# Patient Record
Sex: MALE | Race: WHITE | URBAN - METROPOLITAN AREA
[De-identification: names, ages, dates, MRNs, and addresses within clinical notes are randomized per-mention and may not be internally consistent; named-entity substitution may affect disease eponyms.]

---

## 2019-02-13 ENCOUNTER — HOSPITAL ENCOUNTER (OUTPATIENT)
Dept: LAB | Age: 54
Discharge: HOME | End: 2019-02-13

## 2019-02-13 PROCEDURE — 88305 TISSUE EXAM BY PATHOLOGIST: CPT

## 2022-10-09 ENCOUNTER — HOSPITAL ENCOUNTER (EMERGENCY)
Dept: CT IMAGING | Age: 57
Discharge: HOME | End: 2022-10-12

## 2022-10-09 ENCOUNTER — HOSPITAL ENCOUNTER (EMERGENCY)
Age: 57
Discharge: HOME | End: 2022-10-09
Attending: EMERGENCY MEDICINE

## 2022-10-09 VITALS
WEIGHT: 270 LBS | TEMPERATURE: 98.6 F | RESPIRATION RATE: 18 BRPM | OXYGEN SATURATION: 96 % | HEIGHT: 77 IN | BODY MASS INDEX: 31.88 KG/M2 | DIASTOLIC BLOOD PRESSURE: 88 MMHG | HEART RATE: 76 BPM | SYSTOLIC BLOOD PRESSURE: 154 MMHG

## 2022-10-09 DIAGNOSIS — H53.2 DOUBLE VISION WITH BOTH EYES OPEN: ICD-10-CM

## 2022-10-09 DIAGNOSIS — H49.12 LEFT-SIDED FOURTH CRANIAL NERVE PALSY: Primary | ICD-10-CM

## 2022-10-09 LAB
ALBUMIN SERPL-MCNC: 3.8 G/DL (ref 3.5–5)
ALBUMIN/GLOB SERPL: 1.1 {RATIO} (ref 1.2–3.5)
ALP SERPL-CCNC: 89 U/L (ref 50–136)
ALT SERPL-CCNC: 44 U/L (ref 12–65)
ANION GAP SERPL CALC-SCNC: 4 MMOL/L (ref 4–13)
AST SERPL-CCNC: 30 U/L (ref 15–37)
BILIRUB SERPL-MCNC: 0.4 MG/DL (ref 0.2–1.1)
BUN SERPL-MCNC: 19 MG/DL (ref 6–23)
CALCIUM SERPL-MCNC: 9.4 MG/DL (ref 8.3–10.4)
CHLORIDE SERPL-SCNC: 104 MMOL/L (ref 101–110)
CO2 SERPL-SCNC: 30 MMOL/L (ref 21–32)
CREAT SERPL-MCNC: 1.17 MG/DL (ref 0.8–1.5)
ERYTHROCYTE [DISTWIDTH] IN BLOOD BY AUTOMATED COUNT: 11.5 % (ref 11.9–14.6)
GLOBULIN SER CALC-MCNC: 3.4 G/DL (ref 2.3–3.5)
GLUCOSE SERPL-MCNC: 151 MG/DL (ref 65–100)
HCT VFR BLD AUTO: 43.1 % (ref 41.1–50.3)
HGB BLD-MCNC: 15.2 G/DL (ref 13.6–17.2)
MCH RBC QN AUTO: 31.9 PG (ref 26.1–32.9)
MCHC RBC AUTO-ENTMCNC: 35.3 G/DL (ref 31.4–35)
MCV RBC AUTO: 90.5 FL (ref 79.6–97.8)
NRBC # BLD: 0 K/UL (ref 0–0.2)
PLATELET # BLD AUTO: 146 K/UL (ref 150–450)
PMV BLD AUTO: 10.2 FL (ref 9.4–12.3)
POTASSIUM SERPL-SCNC: 3.9 MMOL/L (ref 3.5–5.1)
PROT SERPL-MCNC: 7.2 G/DL (ref 6.3–8.2)
RBC # BLD AUTO: 4.76 M/UL (ref 4.23–5.6)
SODIUM SERPL-SCNC: 138 MMOL/L (ref 136–145)
WBC # BLD AUTO: 4.8 K/UL (ref 4.3–11.1)

## 2022-10-09 PROCEDURE — 99284 EMERGENCY DEPT VISIT MOD MDM: CPT

## 2022-10-09 PROCEDURE — 70450 CT HEAD/BRAIN W/O DYE: CPT

## 2022-10-09 PROCEDURE — 70486 CT MAXILLOFACIAL W/O DYE: CPT

## 2022-10-09 PROCEDURE — 80053 COMPREHEN METABOLIC PANEL: CPT

## 2022-10-09 PROCEDURE — 6370000000 HC RX 637 (ALT 250 FOR IP): Performed by: EMERGENCY MEDICINE

## 2022-10-09 PROCEDURE — 85027 COMPLETE CBC AUTOMATED: CPT

## 2022-10-09 RX ORDER — ACETAMINOPHEN 325 MG/1
650 TABLET ORAL
Status: COMPLETED | OUTPATIENT
Start: 2022-10-09 | End: 2022-10-09

## 2022-10-09 RX ORDER — TETRACAINE HYDROCHLORIDE 5 MG/ML
1 SOLUTION OPHTHALMIC ONCE
Status: DISCONTINUED | OUTPATIENT
Start: 2022-10-09 | End: 2022-10-09 | Stop reason: HOSPADM

## 2022-10-09 RX ADMIN — ACETAMINOPHEN 650 MG: 325 TABLET, FILM COATED ORAL at 20:53

## 2022-10-09 ASSESSMENT — ENCOUNTER SYMPTOMS
COLOR CHANGE: 0
PHOTOPHOBIA: 1
COUGH: 0
DIARRHEA: 0
EYE DISCHARGE: 0
SINUS PRESSURE: 1
BACK PAIN: 0
SHORTNESS OF BREATH: 0
ABDOMINAL PAIN: 0
VOMITING: 0
NAUSEA: 0
RHINORRHEA: 0
EYE PAIN: 0

## 2022-10-09 ASSESSMENT — PAIN DESCRIPTION - DESCRIPTORS: DESCRIPTORS: TENDER

## 2022-10-09 ASSESSMENT — VISUAL ACUITY: OD: 20/20 WITH GLASSES

## 2022-10-09 ASSESSMENT — PAIN DESCRIPTION - LOCATION: LOCATION: EYE

## 2022-10-09 ASSESSMENT — PAIN SCALES - GENERAL: PAINLEVEL_OUTOF10: 5

## 2022-10-09 ASSESSMENT — PAIN DESCRIPTION - ORIENTATION: ORIENTATION: RIGHT;LEFT

## 2022-10-09 ASSESSMENT — PAIN - FUNCTIONAL ASSESSMENT: PAIN_FUNCTIONAL_ASSESSMENT: 0-10

## 2022-10-09 NOTE — ED TRIAGE NOTES
Patient reports on Friday 10/7 he noticed his vision was a little blurry. Pt reports increased blurriness on Saturday morning and vision has not improved since then. Pt also reports nasal congestion and sinus pressure x1 week. Pt denies recent eye injury. [facility] in triage 0.

## 2022-10-09 NOTE — ED PROVIDER NOTES
Emergency Department Provider Note                   PCP:                Rani Man MD               Age: 62 y.o. Sex: male       ICD-10-CM    1. Left-sided fourth cranial nerve palsy  H49.12       2. Double vision with both eyes open  H53.2           DISPOSITION Decision To Discharge 10/09/2022 08:42:45 PM        MDM  Number of Diagnoses or Management Options  Diagnosis management comments: Since extraocular movements are intact and his pupils are equal round reactive to light. I do not see any obvious sign of a cranial nerve third fourth or sixth palsy. Staining of the eye was negative for corneal abrasion and the pressure on the left was 21 and the pressure on the right was 18. Funduscopic exam was done and I did not see an obvious retinal hemorrhage, however exam was limited and I cannot see his optic disks clearly. CT scan imaging of the sinuses showed no significant sinusitis and only some mild maxillary lower wall inflammation. CT scan of the brain was normal.  Patient will need referral to ophthalmology certainly but I will obtain a neurology consult via teleneurology to see if any further CT scan imaging such as a CTA or MRI is warranted at this time. 8:42 PM  Patient and his wife would wish to be discharged and follow-up with eye doctor do not wish to wait any longer for the teleneurology consult. I believe this is okay and appropriate. I recommended patching the eye to help with his symptoms and we will provide him follow-up with a neuro-ophthalmologist as best I can. Amount and/or Complexity of Data Reviewed  Clinical lab tests: ordered and reviewed  Tests in the radiology section of CPT®: ordered and reviewed  Discuss the patient with other providers: yes (Case with the on-call ophthalmologist Dr. Lulu Sena.   Based on my examination and the history he believes the patient has a 4th cranial nerve palsy and reported to me that those can be very subtle and not obvious. He states they do not do imaging unless the persistent symptoms occur and last for 3 months. Teleneurology consult still pending. He recommended patching the eye to help with the symptoms so he does not have to tolerate the double vision. He recommended follow-up with a neuro-ophthalmologist.)    Risk of Complications, Morbidity, and/or Mortality  Presenting problems: moderate  Diagnostic procedures: low  Management options: low    Patient Progress  Patient progress: stable             Orders Placed This Encounter   Procedures    CT HEAD WO CONTRAST    CT SINUS WO CONTRAST    CBC    Comprehensive Metabolic Panel    Visual Acuity Screening    Please Bring Woods Lamp to Bedside    Insert peripheral IV        Medications   tetracaine (TETRAVISC) 0.5 % ophthalmic solution 1 drop (has no administration in time range)   fluorescein ophthalmic strip 1 mg (has no administration in time range)   acetaminophen (TYLENOL) tablet 650 mg (has no administration in time range)       New Prescriptions    No medications on file        Nikc Lanier is a 62 y.o. male who presents to the Emergency Department with chief complaint of    Chief Complaint   Patient presents with    Blurred Vision      80-year-old male presents with complaint of blurred vision which he describes as a double vision where things seem stacked on top of each other when he uses both eyes. His symptoms improve with covering each eye. His symptoms improved when looking up. For 5 days or so he has had some sinus congestion and frontal sinus pressure, however that is relieved today after using Ellie pot and sinus rinse at home. He denies any fevers or chills. He has had mild frontal headaches but nothing severe. He denies any numbness tingling or weakness on the left or right or any trouble with his speech or swallowing. Review of Systems   Constitutional:  Negative for chills and fever.    HENT:  Positive for congestion and sinus pressure. Negative for rhinorrhea. Eyes:  Positive for photophobia and visual disturbance. Negative for pain and discharge. Respiratory:  Negative for cough and shortness of breath. Cardiovascular:  Negative for chest pain and leg swelling. Gastrointestinal:  Negative for abdominal pain, diarrhea, nausea and vomiting. Endocrine: Negative for polydipsia and polyuria. Genitourinary:  Negative for dysuria, frequency and hematuria. Musculoskeletal:  Negative for back pain and myalgias. Skin:  Negative for color change and rash. Neurological:  Negative for weakness and numbness. All other systems reviewed and are negative. No past medical history on file. No past surgical history on file. No family history on file. Social History     Socioeconomic History    Marital status:          Patient has no known allergies. Previous Medications    No medications on file        Vitals signs and nursing note reviewed. No data found. Physical Exam  Vitals and nursing note reviewed. Constitutional:       Appearance: Normal appearance. HENT:      Head: Normocephalic and atraumatic. Nose: Nose normal.      Mouth/Throat:      Mouth: Mucous membranes are moist.   Eyes:      Conjunctiva/sclera: Conjunctivae normal.      Pupils: Pupils are equal, round, and reactive to light. Cardiovascular:      Rate and Rhythm: Normal rate and regular rhythm. Pulses: Normal pulses. Heart sounds: Normal heart sounds. Pulmonary:      Effort: Pulmonary effort is normal.      Breath sounds: Normal breath sounds. Abdominal:      General: There is no distension. Palpations: Abdomen is soft. Tenderness: There is no abdominal tenderness. There is no guarding or rebound. Musculoskeletal:         General: Normal range of motion. Skin:     General: Skin is warm and dry. Neurological:      Mental Status: He is alert and oriented to person, place, and time.       Cranial Nerves: No cranial nerve deficit. Sensory: No sensory deficit. Motor: No weakness. Coordination: Coordination normal.      Gait: Gait normal.      Deep Tendon Reflexes:      Reflex Scores:       Patellar reflexes are 2+ on the right side and 2+ on the left side. Psychiatric:         Behavior: Behavior normal.        Procedures    Results for orders placed or performed during the hospital encounter of 10/09/22   CT HEAD WO CONTRAST    Narrative    EXAMINATION: CT head without contrast. CT sinus without contrast. 10/9/2022 4:19  PM    ACCESSION NUMBER: [id number], [id number]    INDICATION: double vision, frontal headaches and sinus pressure for 2-5 days    COMPARISON: None available    TECHNIQUE: Multiple-row detector helical CT examination of the head without  intravenous contrast.     Radiation dose reduction techniques were used for this study:  Our CT scanners  use one or all of the following: Automated exposure control, adjustment of the  mA and/or kVp according to patient's size, iterative reconstruction. FINDINGS:  No intracranial hemorrhage. No evidence of mass or acute large territory  ischemic infarct. The ventricles are normal in size and position. The basal  cisterns are patent. No extra-axial fluid collection. The orbital contents are within normal limits. Minimal mucosal thickening along  the floor of the maxillary sinuses. Otherwise, the paranasal sinuses are well  aerated without mucosal thickening or fluid. The paranasal sinus drainage  pathways remain patent. The mastoid air cells and middle ears are clear. No acute  or aggressive osseous abnormality. No significant extracranial soft tissue  abnormality. Impression    CT head and sinus:  1. No acute intracranial abnormality. 2. No significant paranasal sinus disease.          CT SINUS WO CONTRAST    Narrative    EXAMINATION: CT head without contrast. CT sinus without contrast. 10/9/2022 4:19  PM    ACCESSION NUMBER: [id number], [id number]    INDICATION: double vision, frontal headaches and sinus pressure for 2-5 days    COMPARISON: None available    TECHNIQUE: Multiple-row detector helical CT examination of the head without  intravenous contrast.     Radiation dose reduction techniques were used for this study:  Our CT scanners  use one or all of the following: Automated exposure control, adjustment of the  mA and/or kVp according to patient's size, iterative reconstruction. FINDINGS:  No intracranial hemorrhage. No evidence of mass or acute large territory  ischemic infarct. The ventricles are normal in size and position. The basal  cisterns are patent. No extra-axial fluid collection. The orbital contents are within normal limits. Minimal mucosal thickening along  the floor of the maxillary sinuses. Otherwise, the paranasal sinuses are well  aerated without mucosal thickening or fluid. The paranasal sinus drainage  pathways remain patent. The mastoid air cells and middle ears are clear. No acute  or aggressive osseous abnormality. No significant extracranial soft tissue  abnormality. Impression    CT head and sinus:  1. No acute intracranial abnormality. 2. No significant paranasal sinus disease.          CBC   Result Value Ref Range    WBC 4.8 4.3 - 11.1 K/uL    RBC 4.76 4.23 - 5.6 M/uL    Hemoglobin 15.2 13.6 - 17.2 g/dL    Hematocrit 43.1 41.1 - 50.3 %    MCV 90.5 79.6 - 97.8 FL    MCH 31.9 26.1 - 32.9 PG    MCHC 35.3 (H) 31.4 - 35.0 g/dL    RDW 11.5 (L) 11.9 - 14.6 %    Platelets 577 (L) 276 - 450 K/uL    MPV 10.2 9.4 - 12.3 FL    nRBC 0.00 0.0 - 0.2 K/uL   Comprehensive Metabolic Panel   Result Value Ref Range    Sodium 138 136 - 145 mmol/L    Potassium 3.9 3.5 - 5.1 mmol/L    Chloride 104 101 - 110 mmol/L    CO2 30 21 - 32 mmol/L    Anion Gap 4 4 - 13 mmol/L    Glucose 151 (H) 65 - 100 mg/dL    BUN 19 6 - 23 MG/DL    Creatinine 1.17 0.8 - 1.5 MG/DL    Est, Glom Filt Rate >60 >60 ml/min/1.73m2    Calcium 9.4 8.3 - 10.4 MG/DL    Total Bilirubin 0.4 0.2 - 1.1 MG/DL    ALT 44 12 - 65 U/L    AST 30 15 - 37 U/L    Alk Phosphatase 89 50 - 136 U/L    Total Protein 7.2 6.3 - 8.2 g/dL    Albumin 3.8 3.5 - 5.0 g/dL    Globulin 3.4 2.3 - 3.5 g/dL    Albumin/Globulin Ratio 1.1 (L) 1.2 - 3.5          CT HEAD WO CONTRAST   Final Result   CT head and sinus:   1. No acute intracranial abnormality. 2. No significant paranasal sinus disease. CT SINUS WO CONTRAST   Final Result   CT head and sinus:   1. No acute intracranial abnormality. 2. No significant paranasal sinus disease. Voice dictation software was used during the making of this note. This software is not perfect and grammatical and other typographical errors may be present. This note has not been completely proofread for errors.      Erika Hoang MD  10/09/22 [id number]

## 2022-10-10 NOTE — DISCHARGE INSTRUCTIONS
Patch your left eye to help with double vision until symptoms resolved. Follow-up with your PCP and with Dr. Taurus Huerta tomorrow for appointments. Continued management of diabetes and hypertension strongly recommended. As a precaution start a low-dose 81 mg aspirin daily.

## 2024-10-11 NOTE — ED NOTES
I have reviewed discharge instructions with the patient and spouse. The patient and spouse verbalized understanding. Patient left ED via Discharge Method: ambulatory to Home with self and spouse. Opportunity for questions and clarification provided. Patient given 0 scripts. To continue your aftercare when you leave the hospital, you may receive an automated call from our care team to check in on how you are doing. This is a free service and part of our promise to provide the best care and service to meet your aftercare needs.  If you have questions, or wish to unsubscribe from this service please call [phone]. Thank you for Choosing our [facility].         Bhupinder Cho RN  10/09/22 [id number]
Ophthalmology paged
2

## 2025-08-14 ENCOUNTER — INITIAL CONSULT (OUTPATIENT)
Age: 60
End: 2025-08-14
Payer: COMMERCIAL

## 2025-08-14 VITALS
HEART RATE: 68 BPM | SYSTOLIC BLOOD PRESSURE: 122 MMHG | BODY MASS INDEX: 32.71 KG/M2 | HEIGHT: 77 IN | DIASTOLIC BLOOD PRESSURE: 78 MMHG | WEIGHT: 277 LBS

## 2025-08-14 DIAGNOSIS — Z76.89 ENCOUNTER TO ESTABLISH CARE: Primary | ICD-10-CM

## 2025-08-14 DIAGNOSIS — R06.09 DOE (DYSPNEA ON EXERTION): ICD-10-CM

## 2025-08-14 DIAGNOSIS — I10 PRIMARY HYPERTENSION: ICD-10-CM

## 2025-08-14 DIAGNOSIS — R53.82 CHRONIC FATIGUE: ICD-10-CM

## 2025-08-14 PROCEDURE — 99203 OFFICE O/P NEW LOW 30 MIN: CPT | Performed by: INTERNAL MEDICINE

## 2025-08-14 PROCEDURE — 3078F DIAST BP <80 MM HG: CPT | Performed by: INTERNAL MEDICINE

## 2025-08-14 PROCEDURE — 93000 ELECTROCARDIOGRAM COMPLETE: CPT | Performed by: INTERNAL MEDICINE

## 2025-08-14 PROCEDURE — 3074F SYST BP LT 130 MM HG: CPT | Performed by: INTERNAL MEDICINE

## 2025-08-14 RX ORDER — INSULIN ASPART 100 [IU]/ML
5-10 INJECTION, SOLUTION INTRAVENOUS; SUBCUTANEOUS
COMMUNITY

## 2025-08-14 RX ORDER — INSULIN GLARGINE 100 [IU]/ML
INJECTION, SOLUTION SUBCUTANEOUS
COMMUNITY
Start: 2025-06-18

## 2025-08-14 RX ORDER — ASPIRIN 81 MG/1
81 TABLET ORAL DAILY
COMMUNITY

## 2025-08-14 RX ORDER — AMLODIPINE BESYLATE 5 MG/1
10 TABLET ORAL DAILY
COMMUNITY

## 2025-08-14 RX ORDER — VALSARTAN AND HYDROCHLOROTHIAZIDE 320; 12.5 MG/1; MG/1
TABLET, FILM COATED ORAL
COMMUNITY
Start: 2025-06-18

## 2025-08-14 ASSESSMENT — ENCOUNTER SYMPTOMS
COLOR CHANGE: 0
HOARSE VOICE: 0
SHORTNESS OF BREATH: 1
WHEEZING: 0
SWOLLEN GLANDS: 0
VISUAL CHANGE: 0
SPUTUM PRODUCTION: 0
HEMATEMESIS: 0
NAUSEA: 0
CHANGE IN BOWEL HABIT: 0
BLURRED VISION: 0
VOMITING: 0
COUGH: 0
HEMOPTYSIS: 0
SORE THROAT: 0
RHINORRHEA: 0
DIARRHEA: 0
ORTHOPNEA: 0
ABDOMINAL PAIN: 0
HEMATOCHEZIA: 0
BOWEL INCONTINENCE: 0